# Patient Record
Sex: FEMALE | Race: BLACK OR AFRICAN AMERICAN | URBAN - METROPOLITAN AREA
[De-identification: names, ages, dates, MRNs, and addresses within clinical notes are randomized per-mention and may not be internally consistent; named-entity substitution may affect disease eponyms.]

---

## 2019-07-19 ENCOUNTER — EMERGENCY (EMERGENCY)
Facility: HOSPITAL | Age: 64
LOS: 1 days | Discharge: HOME | End: 2019-07-19
Attending: EMERGENCY MEDICINE | Admitting: EMERGENCY MEDICINE
Payer: MEDICARE

## 2019-07-19 VITALS
DIASTOLIC BLOOD PRESSURE: 83 MMHG | TEMPERATURE: 98 F | HEART RATE: 92 BPM | SYSTOLIC BLOOD PRESSURE: 158 MMHG | OXYGEN SATURATION: 99 % | RESPIRATION RATE: 18 BRPM

## 2019-07-19 VITALS
RESPIRATION RATE: 18 BRPM | TEMPERATURE: 98 F | SYSTOLIC BLOOD PRESSURE: 115 MMHG | OXYGEN SATURATION: 98 % | HEART RATE: 13 BPM | DIASTOLIC BLOOD PRESSURE: 72 MMHG

## 2019-07-19 DIAGNOSIS — J45.901 UNSPECIFIED ASTHMA WITH (ACUTE) EXACERBATION: ICD-10-CM

## 2019-07-19 PROCEDURE — 99284 EMERGENCY DEPT VISIT MOD MDM: CPT

## 2019-07-19 RX ORDER — IPRATROPIUM/ALBUTEROL SULFATE 18-103MCG
3 AEROSOL WITH ADAPTER (GRAM) INHALATION ONCE
Refills: 0 | Status: COMPLETED | OUTPATIENT
Start: 2019-07-19 | End: 2019-07-19

## 2019-07-19 RX ORDER — ALBUTEROL 90 UG/1
2 AEROSOL, METERED ORAL
Qty: 1 | Refills: 0
Start: 2019-07-19 | End: 2019-08-17

## 2019-07-19 RX ADMIN — Medication 3 MILLILITER(S): at 18:48

## 2019-07-19 RX ADMIN — Medication 60 MILLIGRAM(S): at 18:49

## 2019-07-19 NOTE — ED ADULT TRIAGE NOTE - CHIEF COMPLAINT QUOTE
Patient to ED for asthma exacerbation.  Given 3 albuterol nebs en route and feels better upon arrival.  Patient in no respiratory distress

## 2019-07-19 NOTE — ED PROVIDER NOTE - OBJECTIVE STATEMENT
62 y/o F with PMHx of MS and asthma presents to the ED with c/o acute asthma exacerbation. Pt reports she was at [employer] looking for train and the environment in the station was hot and humid. She began to feel SOB as she was walking up and down multiple stairs, and felt a subsequent worsening of asthma like symptoms. She promotes Sx felt exactly like an asthma exacerbation to her. Pt was seen by EMS who gave her nebulizer Tx PTA (but was not given Solu-medrol by EMS) which helped provide symptomatic relief. While resting in bed, Pt promotes continued improvement. She denies any CP, diaphoresis, N/V, or any other acute medical complaints.

## 2019-07-19 NOTE — ED PROVIDER NOTE - CPE EDP CARDIAC NORM
Quality 111:Pneumonia Vaccination Status For Older Adults: Pneumococcal Vaccination Previously Received Detail Level: Detailed Quality 110: Preventive Care And Screening: Influenza Immunization: Influenza Immunization Administered during Influenza season normal...

## 2019-07-19 NOTE — ED PROVIDER NOTE - PROGRESS NOTE DETAILS
Symptoms improved with prednisone and duoneb. Patient well appearing. Will DC with prednisone burst and outpatient follow up. Patient in agreement.

## 2019-07-19 NOTE — ED ADULT NURSE NOTE - NSIMPLEMENTINTERV_GEN_ALL_ED
Implemented All Universal Safety Interventions:  [place] to call system. Call bell, personal items and telephone within reach. Instruct patient to call for assistance. Room bathroom lighting operational. Non-slip footwear when patient is off stretcher. Physically safe environment: no spills, clutter or unnecessary equipment. Stretcher in lowest position, wheels locked, appropriate side rails in place.

## 2019-07-19 NOTE — ED PROVIDER NOTE - CLINICAL SUMMARY MEDICAL DECISION MAKING FREE TEXT BOX
64 y/o F with PMHx of asthma presenting with asthma exacerbation. Will give additional nebulizer Tx and prednisone. Will reassess afterwards.